# Patient Record
Sex: FEMALE | Race: WHITE | NOT HISPANIC OR LATINO | Employment: FULL TIME | URBAN - METROPOLITAN AREA
[De-identification: names, ages, dates, MRNs, and addresses within clinical notes are randomized per-mention and may not be internally consistent; named-entity substitution may affect disease eponyms.]

---

## 2018-01-29 ENCOUNTER — TELEPHONE (OUTPATIENT)
Dept: URGENT CARE | Facility: CLINIC | Age: 23
End: 2018-01-29

## 2018-01-29 NOTE — TELEPHONE ENCOUNTER
Notified Ms. PathakShitalDirkviky of radiology report of CXR performed in our office on 1/26/18, she states pain is some better with NSAIDs and she has made an appointment with her Neurosurgeon in [place] for Wednesday 1/31/18. She does have a disc of the xray and will stop to get radiology report.